# Patient Record
Sex: MALE | Race: WHITE | NOT HISPANIC OR LATINO
[De-identification: names, ages, dates, MRNs, and addresses within clinical notes are randomized per-mention and may not be internally consistent; named-entity substitution may affect disease eponyms.]

---

## 2017-12-31 ENCOUNTER — NON-APPOINTMENT (OUTPATIENT)
Age: 57
End: 2017-12-31

## 2018-09-27 ENCOUNTER — FORM ENCOUNTER (OUTPATIENT)
Age: 58
End: 2018-09-27

## 2020-09-16 ENCOUNTER — EMERGENCY (EMERGENCY)
Facility: HOSPITAL | Age: 60
LOS: 0 days | Discharge: HOME | End: 2020-09-16
Payer: COMMERCIAL

## 2020-09-16 VITALS
SYSTOLIC BLOOD PRESSURE: 109 MMHG | RESPIRATION RATE: 18 BRPM | TEMPERATURE: 99 F | HEART RATE: 74 BPM | OXYGEN SATURATION: 95 % | DIASTOLIC BLOOD PRESSURE: 62 MMHG

## 2020-09-16 DIAGNOSIS — Z20.828 CONTACT WITH AND (SUSPECTED) EXPOSURE TO OTHER VIRAL COMMUNICABLE DISEASES: ICD-10-CM

## 2020-09-16 PROCEDURE — 99283 EMERGENCY DEPT VISIT LOW MDM: CPT

## 2020-09-16 PROCEDURE — U0003: CPT

## 2020-09-16 NOTE — ED STATDOCS - OBJECTIVE STATEMENT
Pt presents to ED for covid swab. Needs swab to take dad to dental appointment. No complaints at this time.   Pt here for testing.

## 2020-09-16 NOTE — ED STATDOCS - PATIENT PORTAL LINK FT
You can access the FollowMyHealth Patient Portal offered by [facility] by registering at the following website: [email or web address]. By joining [employer]’s FollowMyHealth portal, you will also be able to view your health information using other applications (apps) compatible with our system.

## 2020-09-16 NOTE — ED STATDOCS - PHYSICAL EXAMINATION
Constitutional: NAD AAOx3. Nontoxic, well appearing. Speaking full sentences  w/o distress  Head: Normocephalic atraumatic  Mouth: no airway obstruction  Cardiac: [id number]   Resp: Lungs CTA B/L  Abd: soft, nd. NTTP. No r/g/r.   Neuro: motor and sensory intact

## 2020-09-17 LAB — SARS-COV-2 RNA SPEC QL NAA+PROBE: SIGNIFICANT CHANGE UP

## 2021-05-14 ENCOUNTER — NON-APPOINTMENT (OUTPATIENT)
Age: 61
End: 2021-05-14

## 2021-05-14 ENCOUNTER — APPOINTMENT (OUTPATIENT)
Dept: FAMILY MEDICINE | Facility: CLINIC | Age: 61
End: 2021-05-14
Payer: COMMERCIAL

## 2021-05-14 VITALS
HEIGHT: 65 IN | SYSTOLIC BLOOD PRESSURE: 120 MMHG | HEART RATE: 65 BPM | TEMPERATURE: 98.5 F | DIASTOLIC BLOOD PRESSURE: 70 MMHG | BODY MASS INDEX: 31.65 KG/M2 | OXYGEN SATURATION: 98 % | WEIGHT: 190 LBS

## 2021-05-14 DIAGNOSIS — Z82.0 FAMILY HISTORY OF EPILEPSY AND OTHER DISEASES OF THE NERVOUS SYSTEM: ICD-10-CM

## 2021-05-14 DIAGNOSIS — Z78.9 OTHER SPECIFIED HEALTH STATUS: ICD-10-CM

## 2021-05-14 PROCEDURE — 93000 ELECTROCARDIOGRAM COMPLETE: CPT

## 2021-05-14 PROCEDURE — 99072 ADDL SUPL MATRL&STAF TM PHE: CPT

## 2021-05-14 PROCEDURE — 99386 PREV VISIT NEW AGE 40-64: CPT | Mod: 25

## 2021-05-14 RX ORDER — UBIDECARENONE/VIT E ACET 100MG-5
25 MCG CAPSULE ORAL
Refills: 0 | Status: ACTIVE | COMMUNITY

## 2021-05-14 RX ORDER — QUINAPRIL HYDROCHLORIDE 10 MG/1
10 TABLET, FILM COATED ORAL
Refills: 0 | Status: ACTIVE | COMMUNITY

## 2021-05-14 RX ORDER — LEVOTHYROXINE SODIUM 0.05 MG/1
50 TABLET ORAL
Refills: 0 | Status: ACTIVE | COMMUNITY

## 2021-05-14 NOTE — PLAN
[FreeTextEntry1] : Reviewed age-appropriate preventive screening tests with patient. He is not due for any vaccines or colonoscopy.\par \par Discussed clean eating (e.g. Mediterranean style diet) and recommendations for regular exercise/staying as physically active as possible. He would like to lose 20 pounds and will work on diet and exercise for this.\par \par Reviewed importance of good self care (e.g. meditation, yoga, adequate rest, regular exercise, magnesium, clean eating, etc.).\par \par Rx given for labs today. He will have his blood drawn next week for Dr. Pitts and those results will be reviewed once received.

## 2021-05-14 NOTE — HISTORY OF PRESENT ILLNESS
[FreeTextEntry1] : RAFA ULRICH is a 61 year old male here for a physical exam.\par  [de-identified] : Patient is new to our practice. He previously saw Dr. Webber who has retired. He was referred by his brother Bharath who is a patient of our office.\par \par He has had the COVID vaccine. He has had the flu vaccine and Shingrix (both doses). He believes he is up to date with his Tdap. His last colonoscopy was a few years ago (Dr. Chen). He is up to date with the dentist and eye doctor. He does not see a dermatologist. He exercises regularly (exercise bicycle). His diet is good but he has gained weight.\par \par He was diagnosed with diabetes a few years ago. He has been seeing Dr. Pitts for this and his most recent HgbA1c was under 6.0. He is not taking any medication for diabetes. \par \par He also has hypertension and hypothyroidism, both controlled on medication.

## 2021-05-14 NOTE — ASSESSMENT
[FreeTextEntry1] : He has a history of diet controlled diabetes, hypertension, and hypothyroidism. These have all been managed by his endocrinologist and he is scheduled for blood work for the endocrinologist next week.\par \par His EKG today shows an incomplete RBBB and left anterior fascicular block. He has no history of cardiac disease and no cardiac symptoms. Will request an old EKG from his previous PMD for comparison.

## 2021-08-02 ENCOUNTER — APPOINTMENT (OUTPATIENT)
Dept: FAMILY MEDICINE | Facility: CLINIC | Age: 61
End: 2021-08-02
Payer: COMMERCIAL

## 2021-08-02 PROCEDURE — 36415 COLL VENOUS BLD VENIPUNCTURE: CPT

## 2021-08-03 ENCOUNTER — NON-APPOINTMENT (OUTPATIENT)
Age: 61
End: 2021-08-03

## 2021-08-03 LAB — PSA SERPL-MCNC: 0.15 NG/ML

## 2021-09-22 ENCOUNTER — APPOINTMENT (OUTPATIENT)
Dept: FAMILY MEDICINE | Facility: CLINIC | Age: 61
End: 2021-09-22
Payer: COMMERCIAL

## 2021-09-22 VITALS
BODY MASS INDEX: 31.82 KG/M2 | DIASTOLIC BLOOD PRESSURE: 70 MMHG | TEMPERATURE: 96.2 F | WEIGHT: 191 LBS | SYSTOLIC BLOOD PRESSURE: 116 MMHG | HEIGHT: 65 IN | OXYGEN SATURATION: 97 % | HEART RATE: 73 BPM

## 2021-09-22 DIAGNOSIS — M25.529 PAIN IN UNSPECIFIED ELBOW: ICD-10-CM

## 2021-09-22 DIAGNOSIS — Z23 ENCOUNTER FOR IMMUNIZATION: ICD-10-CM

## 2021-09-22 PROCEDURE — 90686 IIV4 VACC NO PRSV 0.5 ML IM: CPT

## 2021-09-22 PROCEDURE — G0008: CPT

## 2021-09-22 PROCEDURE — 99213 OFFICE O/P EST LOW 20 MIN: CPT | Mod: 25

## 2021-09-22 NOTE — HISTORY OF PRESENT ILLNESS
[FreeTextEntry8] : Pt has had right elbow discomfort x several weeks , he has tried cold compresses and tennis elbow brace with out any relief\par He admits he is left handed and in recent weeks he has been weeding his garden, and using his non dominant right arm .  \par He admits after several hours of repetitive weeding , his elbow feels achy. He denies any swelling , redness or crepitation

## 2021-09-22 NOTE — PLAN
[FreeTextEntry1] : Advised to use elbow brace,and limited activity of right elbow.  I have advised warm and cold compresses , and topical Voltaren gel . \par Flu vaccine administered today

## 2022-04-05 ENCOUNTER — APPOINTMENT (OUTPATIENT)
Dept: FAMILY MEDICINE | Facility: CLINIC | Age: 62
End: 2022-04-05
Payer: COMMERCIAL

## 2022-04-05 VITALS
HEIGHT: 65 IN | BODY MASS INDEX: 31.65 KG/M2 | HEART RATE: 85 BPM | DIASTOLIC BLOOD PRESSURE: 72 MMHG | TEMPERATURE: 96.3 F | OXYGEN SATURATION: 98 % | WEIGHT: 190 LBS | SYSTOLIC BLOOD PRESSURE: 122 MMHG

## 2022-04-05 DIAGNOSIS — E55.9 VITAMIN D DEFICIENCY, UNSPECIFIED: ICD-10-CM

## 2022-04-05 PROCEDURE — 99214 OFFICE O/P EST MOD 30 MIN: CPT

## 2022-04-05 NOTE — ASSESSMENT
[FreeTextEntry1] : At this point he feels that his acute gout attack has resolved, thanks to Colchicine. This was his second gout attack in 3 weeks, but he had never had a previous attack. His uric acid level was moderately elevated at 8.1.

## 2022-04-05 NOTE — PLAN
[FreeTextEntry1] : We discussed various treatment options for gout, including a low purine diet, use of Colchicine PRN, and a daily urate lowering medication. At this point he would prefer to use Colchicine PRN and monitor for any further episode of gout. He will have labs done by by Dr. CHUN in June and will follow up here for his annual physical in August so the uric acid can be checked twice in the next 4 months. If is uric acid remains elevated or if he has any further gout attacks, would consider prescribing Allopurinol.

## 2022-04-05 NOTE — HISTORY OF PRESENT ILLNESS
[FreeTextEntry8] : Patient presents with gout. This was diagnosed by his podiatrist. He had pain in his left great toe starting about 10 days ago. He saw Dr. Gil last Monday and she checked his uric acid level which was elevated at 8.1. Dr. Gil prescribed colchicine which has been taking 1-2 times per day for the past week. He was taking Advil as well but he stopped this. He had a prior gout attack about 3 weeks prior, also diagnosed by Podiatry. He does not feel that his diet is high in purines.\par \par He has no previous history of gout. He does have diet controlled diabetes, hypertension, and hypothyroidism which are managed by his endocrinologist, Dr. Pitts. He last saw Dr. CHUN in February and will see him again in June.\par \par His last visit to our office was in 5/2021 for his annual physical.

## 2022-04-21 ENCOUNTER — APPOINTMENT (OUTPATIENT)
Dept: FAMILY MEDICINE | Facility: CLINIC | Age: 62
End: 2022-04-21
Payer: COMMERCIAL

## 2022-04-21 VITALS
HEIGHT: 65 IN | OXYGEN SATURATION: 98 % | WEIGHT: 190 LBS | SYSTOLIC BLOOD PRESSURE: 112 MMHG | TEMPERATURE: 97.5 F | DIASTOLIC BLOOD PRESSURE: 72 MMHG | BODY MASS INDEX: 31.65 KG/M2 | HEART RATE: 76 BPM

## 2022-04-21 DIAGNOSIS — E11.9 TYPE 2 DIABETES MELLITUS W/OUT COMPLICATIONS: ICD-10-CM

## 2022-04-21 PROCEDURE — 36415 COLL VENOUS BLD VENIPUNCTURE: CPT

## 2022-04-21 PROCEDURE — 99214 OFFICE O/P EST MOD 30 MIN: CPT | Mod: 25

## 2022-04-21 NOTE — HEALTH RISK ASSESSMENT
[0] : 2) Feeling down, depressed, or hopeless: Not at all (0) [PHQ-2 Negative - No further assessment needed] : PHQ-2 Negative - No further assessment needed [[id number]] : 0

## 2022-04-21 NOTE — HISTORY OF PRESENT ILLNESS
[FreeTextEntry1] : RAFA ULRICH is a 62 year old male here for a follow up visit.  [de-identified] : He is here to discuss treatment for gout. He was diagnosed with gout by his podiatrist in 3/2022 based upon symptoms. He had pain in his great toe which was relieved with colchicine and a uric acid level of 8.1. He had a second episode about 3 weeks after the first, and followed up in our office on 4/5/22 to discuss this. At that time we discussed treatment options for gout including a low purine diet, use of Colchicine PRN, and a daily urate lowering medication. He did not feel that his symptoms warranted a daily medication at that time. He has had one more gout attack in the past 2 weeks since his last visit. He would like a refill on his colchicine.

## 2022-04-21 NOTE — ASSESSMENT
[FreeTextEntry1] : He a history of diet controlled diabetes, hypertension, and hypothyroidism which are managed by his endocrinologist, Dr. Pitts. He last saw Dr. CHUN in February and will see him again in June. He now has a new diagnosis of gout. His labs from Dr. Pitts in 2021 showed an elevated creatinine in the 1.4 to 1.6 range. This suggests that chronic renal failure could be the cause of his new onset gout.\par \par Reviewing his medication list, he is taking Quinapril and Chlorthalidone for hypertension. It is possible that Chlorthalidone is the cause of both his elevated creatinine and uric acid and therefore the trigger for his gout.

## 2022-04-21 NOTE — PLAN
[FreeTextEntry1] : Will recheck his creatinine and uric acid levels today. If either or both of these is still elevated, will hold Chlorthalidone for a few weeks and he will follow up to recheck his blood pressure and labs once more. If his uric acid and creatinine are improved of the diuretic but his BP is elevated, will increase Quinapril or add another agent for BP control.\par \par Discussed clean eating (e.g. Mediterranean style diet) and recommendations for regular exercise/staying as physically active as possible.\par \par Reviewed importance of good self care (e.g. meditation, yoga, adequate rest, regular exercise, magnesium, clean eating, etc.).

## 2022-04-22 LAB
ANION GAP SERPL CALC-SCNC: 14 MMOL/L
BUN SERPL-MCNC: 18 MG/DL
CALCIUM SERPL-MCNC: 10.4 MG/DL
CHLORIDE SERPL-SCNC: 99 MMOL/L
CO2 SERPL-SCNC: 26 MMOL/L
CREAT SERPL-MCNC: 1.31 MG/DL
EGFR: 61 ML/MIN/1.73M2
GLUCOSE SERPL-MCNC: 100 MG/DL
POTASSIUM SERPL-SCNC: 4.2 MMOL/L
SODIUM SERPL-SCNC: 140 MMOL/L
URATE SERPL-MCNC: 8.3 MG/DL

## 2022-04-25 ENCOUNTER — NON-APPOINTMENT (OUTPATIENT)
Age: 62
End: 2022-04-25

## 2022-05-04 ENCOUNTER — APPOINTMENT (OUTPATIENT)
Dept: FAMILY MEDICINE | Facility: CLINIC | Age: 62
End: 2022-05-04
Payer: COMMERCIAL

## 2022-05-04 VITALS — SYSTOLIC BLOOD PRESSURE: 126 MMHG | DIASTOLIC BLOOD PRESSURE: 78 MMHG

## 2022-05-04 PROCEDURE — ZZZZZ: CPT

## 2022-05-09 ENCOUNTER — APPOINTMENT (OUTPATIENT)
Dept: FAMILY MEDICINE | Facility: CLINIC | Age: 62
End: 2022-05-09
Payer: COMMERCIAL

## 2022-05-09 VITALS — SYSTOLIC BLOOD PRESSURE: 118 MMHG | DIASTOLIC BLOOD PRESSURE: 70 MMHG

## 2022-05-09 VITALS
OXYGEN SATURATION: 98 % | SYSTOLIC BLOOD PRESSURE: 128 MMHG | HEIGHT: 65 IN | TEMPERATURE: 98.7 F | WEIGHT: 190 LBS | HEART RATE: 71 BPM | DIASTOLIC BLOOD PRESSURE: 76 MMHG | BODY MASS INDEX: 31.65 KG/M2

## 2022-05-09 PROCEDURE — 36415 COLL VENOUS BLD VENIPUNCTURE: CPT

## 2022-05-09 PROCEDURE — 99213 OFFICE O/P EST LOW 20 MIN: CPT | Mod: 25

## 2022-05-09 RX ORDER — CHLORTHALIDONE 25 MG/1
25 TABLET ORAL
Refills: 0 | Status: DISCONTINUED | COMMUNITY
End: 2022-05-09

## 2022-05-09 NOTE — PLAN
[FreeTextEntry1] : Continue all medications as prescribed. Check labs as above. Will adjust any medications based upon lab results.\par \par Discussed clean eating (e.g. Mediterranean style diet) and recommendations for regular exercise/staying as physically active as possible.\par \par Reviewed importance of good self care (e.g. meditation, yoga, adequate rest, regular exercise, magnesium, clean eating, etc.).

## 2022-05-09 NOTE — ASSESSMENT
[FreeTextEntry1] : He has a history of diet controlled diabetes, hypertension, and hypothyroidism which are managed by his endocrinologist, Dr. Pitts. He last saw Dr. CHUN in February and will see him again in June. He now has a new diagnosis of gout. His labs from Dr. Pitts in 2021 showed an elevated creatinine in the 1.4 to 1.6 range. This suggests that chronic renal failure could be the cause of his new onset gout. He was advised to discontinue his chlorthalidone and return to recheck his blood pressure and labs.\par \par His blood pressure is stable without Chlorthalidone. He has had no gout attacks recently though he was taking Colchicine for about a week after his last visit.

## 2022-05-09 NOTE — HISTORY OF PRESENT ILLNESS
[FreeTextEntry1] : RAFA ULRICH is a 62 year old male here for a follow up visit.  [de-identified] : Patient was diagnosed with gout by his podiatrist in 3/2022 based upon symptoms. He had pain in his great toe which was relieved with colchicine and a uric acid level of 8.1. He had a second episode about 3 weeks after the first and followed up in our office on 4/5/22 to discuss this. At that time we discussed treatment options for gout including a low purine diet, use of Colchicine PRN, and a daily urate lowering medication. He did not feel that his symptoms warranted a daily medication at that time. He had another gout attack between 4/5 and his follow up visit on 4/21. At that time his lab from endocrinology were reviewed and it was discovered that his creatinine was elevated in the 1.4 to 1.6 range over the past year. He was advised to discontinue his chlorthalidone and return to recheck his blood pressure and labs.

## 2022-05-10 LAB
ANION GAP SERPL CALC-SCNC: 13 MMOL/L
BUN SERPL-MCNC: 22 MG/DL
CALCIUM SERPL-MCNC: 10 MG/DL
CHLORIDE SERPL-SCNC: 102 MMOL/L
CO2 SERPL-SCNC: 23 MMOL/L
CREAT SERPL-MCNC: 1.19 MG/DL
EGFR: 69 ML/MIN/1.73M2
GLUCOSE SERPL-MCNC: 87 MG/DL
POTASSIUM SERPL-SCNC: 5.5 MMOL/L
SODIUM SERPL-SCNC: 138 MMOL/L
URATE SERPL-MCNC: 7.8 MG/DL

## 2022-05-12 ENCOUNTER — NON-APPOINTMENT (OUTPATIENT)
Age: 62
End: 2022-05-12

## 2022-05-17 ENCOUNTER — RX CHANGE (OUTPATIENT)
Age: 62
End: 2022-05-17

## 2022-05-18 ENCOUNTER — RX CHANGE (OUTPATIENT)
Age: 62
End: 2022-05-18

## 2022-05-19 ENCOUNTER — RX CHANGE (OUTPATIENT)
Age: 62
End: 2022-05-19

## 2022-05-19 RX ORDER — COLCHICINE 0.6 MG/1
0.6 TABLET ORAL
Qty: 60 | Refills: 3 | Status: DISCONTINUED | COMMUNITY
Start: 2022-05-19 | End: 2022-05-19

## 2022-05-19 RX ORDER — COLCHICINE 0.6 MG/1
0.6 TABLET ORAL
Qty: 180 | Refills: 3 | Status: ACTIVE | COMMUNITY
Start: 2022-05-19 | End: 1900-01-01

## 2022-05-28 ENCOUNTER — NON-APPOINTMENT (OUTPATIENT)
Age: 62
End: 2022-05-28

## 2022-06-06 ENCOUNTER — APPOINTMENT (OUTPATIENT)
Dept: FAMILY MEDICINE | Facility: CLINIC | Age: 62
End: 2022-06-06
Payer: COMMERCIAL

## 2022-06-06 VITALS
WEIGHT: 190 LBS | HEIGHT: 65 IN | OXYGEN SATURATION: 98 % | BODY MASS INDEX: 31.65 KG/M2 | HEART RATE: 73 BPM | SYSTOLIC BLOOD PRESSURE: 122 MMHG | DIASTOLIC BLOOD PRESSURE: 82 MMHG | TEMPERATURE: 97 F

## 2022-06-06 PROCEDURE — 99213 OFFICE O/P EST LOW 20 MIN: CPT

## 2022-06-06 NOTE — PLAN
[FreeTextEntry1] : Will start Allopurinol and he will return to recheck his labs (uric acid, CMP) in August. He is seeing his endocrinologist soon and is having lab done in 2 days prior to that visit.\par \par Discussed clean eating (e.g. Mediterranean style diet) and recommendations for regular exercise/staying as physically active as possible.\par \par Reviewed importance of good self care (e.g. meditation, yoga, adequate rest, regular exercise, magnesium, clean eating, etc.).\par \par Follow up based upon lab results.\par

## 2022-06-06 NOTE — ASSESSMENT
[FreeTextEntry1] : He is here to follow up on hypertension, chronic renal failure, and gout. He also has a history of diabetes and hypothyroidism managed by endocrinology. He has been compliant with medications, diet, and exercise.\par \par His blood pressure is stable without chlorthalidone.\par \par He is currently taking colchicine 0.6 mg daily but would like to try Allopurinol.

## 2022-06-06 NOTE — HISTORY OF PRESENT ILLNESS
[FreeTextEntry1] : RAFA ULRICH is a 62 year old male here for a follow up visit.  [de-identified] : He has a history of hypertension, chronic renal failure, and gout. He was diagnosed with gout in 3/2022 by his podiatrist and was prescribed colchicine. He has had several gout attacks since that time and Allopurinol was recommended. He did not feel that he needed a daily medication initially but now he is interested in trying Allopurinol. \par \par His chlorthalidone was discontinued due to CRF and his creatinine improved. His blood pressure was stable at his last visit without chlorthalidone as well.

## 2022-06-14 ENCOUNTER — FORM ENCOUNTER (OUTPATIENT)
Age: 62
End: 2022-06-14

## 2022-07-27 ENCOUNTER — FORM ENCOUNTER (OUTPATIENT)
Age: 62
End: 2022-07-27

## 2022-07-29 LAB
HBA1C MFR BLD HPLC: 6
HBA1C MFR BLD HPLC: 6

## 2022-08-01 ENCOUNTER — NON-APPOINTMENT (OUTPATIENT)
Age: 62
End: 2022-08-01

## 2022-08-04 ENCOUNTER — APPOINTMENT (OUTPATIENT)
Dept: FAMILY MEDICINE | Facility: CLINIC | Age: 62
End: 2022-08-04

## 2022-08-04 PROCEDURE — 36415 COLL VENOUS BLD VENIPUNCTURE: CPT

## 2022-08-06 LAB
ALBUMIN SERPL ELPH-MCNC: 4.4 G/DL
ALP BLD-CCNC: 76 U/L
ALT SERPL-CCNC: 16 U/L
ANION GAP SERPL CALC-SCNC: 11 MMOL/L
AST SERPL-CCNC: 16 U/L
BILIRUB SERPL-MCNC: 0.4 MG/DL
BUN SERPL-MCNC: 18 MG/DL
CALCIUM SERPL-MCNC: 10.3 MG/DL
CHLORIDE SERPL-SCNC: 103 MMOL/L
CHOLEST SERPL-MCNC: 61 MG/DL
CO2 SERPL-SCNC: 27 MMOL/L
CREAT SERPL-MCNC: 1.19 MG/DL
EGFR: 69 ML/MIN/1.73M2
GLUCOSE SERPL-MCNC: 111 MG/DL
HDLC SERPL-MCNC: 29 MG/DL
LDLC SERPL CALC-MCNC: 19 MG/DL
NONHDLC SERPL-MCNC: 32 MG/DL
POTASSIUM SERPL-SCNC: 4.6 MMOL/L
PROT SERPL-MCNC: 7.8 G/DL
PSA SERPL-MCNC: 0.14 NG/ML
SODIUM SERPL-SCNC: 140 MMOL/L
TRIGL SERPL-MCNC: 62 MG/DL
URATE SERPL-MCNC: 6.2 MG/DL

## 2022-08-24 ENCOUNTER — RESULT CHARGE (OUTPATIENT)
Age: 62
End: 2022-08-24

## 2022-08-25 ENCOUNTER — NON-APPOINTMENT (OUTPATIENT)
Age: 62
End: 2022-08-25

## 2022-08-25 ENCOUNTER — APPOINTMENT (OUTPATIENT)
Dept: FAMILY MEDICINE | Facility: CLINIC | Age: 62
End: 2022-08-25

## 2022-08-25 VITALS
BODY MASS INDEX: 32.49 KG/M2 | SYSTOLIC BLOOD PRESSURE: 110 MMHG | HEART RATE: 83 BPM | DIASTOLIC BLOOD PRESSURE: 82 MMHG | HEIGHT: 65 IN | WEIGHT: 195 LBS | OXYGEN SATURATION: 98 % | TEMPERATURE: 97 F

## 2022-08-25 DIAGNOSIS — Z00.00 ENCOUNTER FOR GENERAL ADULT MEDICAL EXAMINATION W/OUT ABNORMAL FINDINGS: ICD-10-CM

## 2022-08-25 PROCEDURE — 90686 IIV4 VACC NO PRSV 0.5 ML IM: CPT

## 2022-08-25 PROCEDURE — 90472 IMMUNIZATION ADMIN EACH ADD: CPT

## 2022-08-25 PROCEDURE — 36415 COLL VENOUS BLD VENIPUNCTURE: CPT

## 2022-08-25 PROCEDURE — 90715 TDAP VACCINE 7 YRS/> IM: CPT

## 2022-08-25 PROCEDURE — G0008: CPT | Mod: 59

## 2022-08-25 PROCEDURE — 99396 PREV VISIT EST AGE 40-64: CPT | Mod: 25

## 2022-08-25 PROCEDURE — 93000 ELECTROCARDIOGRAM COMPLETE: CPT

## 2022-08-25 RX ORDER — INDOMETHACIN 50 MG/1
50 CAPSULE ORAL
Refills: 0 | Status: ACTIVE | COMMUNITY

## 2022-08-25 NOTE — HEALTH RISK ASSESSMENT
[0] : 2) Feeling down, depressed, or hopeless: Not at all (0) [PHQ-2 Negative - No further assessment needed] : PHQ-2 Negative - No further assessment needed [[id number]] : 0

## 2022-08-25 NOTE — PLAN
[FreeTextEntry1] : Continue all medications as prescribed.\par \par Reviewed age-appropriate preventive screening tests with patient. He is due for Tdap and a flu shot and agreed to both of these today.\par \par Discussed clean eating (e.g. Mediterranean style diet) and recommendations for regular exercise/staying as physically active as possible.\par \par Reviewed importance of good self care (e.g. meditation, yoga, adequate rest, regular exercise, magnesium, clean eating, etc.).\par \par Follow up for next physical in one year.

## 2022-08-25 NOTE — ASSESSMENT
[FreeTextEntry1] : RAFA ULIRCH is a 62 year old male here for a physical exam.\par \par Patient has a history of diet controlled diabetes, hypertension, gout, hypothyroidism, and vitamin D deficiency. He saw a rheumatologist for foot pain and she prescribed Indomethacin. He took this for a week and he feels that it helped. He is currently taking colchicine three days a week and feels that this is helping. He would like a refill on Allopurinol today.\par \par Labs were done prior. His PSA is normal. His uric acid is lower than before since he started Allopurinol. His fasting glucose is 111. He previously had a HgbA1c of 6.0 in June. His cholesterol is very low at 61 with an HDL of 29 and LDL of 19. He is not taking any cholesterol medication so it is unclear why his cholesterol would be so low, though the current numbers are similar to the ones done by his endocrinologist in 2020 and 2021. 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

## 2022-08-25 NOTE — HISTORY OF PRESENT ILLNESS
[FreeTextEntry1] : RAFA ULRICH is a 62 year old male here for a physical exam.  [de-identified] : His last physical exam was last year\par \par Vaccines:\par Tetanus is NOT up to date\par Shingrix is up to date\par COVID vaccine is up to date\par \par His last dentist visit was less than one year ago\par His last eye doctor appointment was less than one year ago\par His last dermatologist visit was never\par \par Colon cancer screening is up to date; colonoscopy in 2018 ()\par \par His diet is healthy overall\par Exercise: exercise bicycle

## 2022-10-18 ENCOUNTER — APPOINTMENT (OUTPATIENT)
Dept: FAMILY MEDICINE | Facility: CLINIC | Age: 62
End: 2022-10-18

## 2022-11-07 ENCOUNTER — APPOINTMENT (OUTPATIENT)
Dept: FAMILY MEDICINE | Facility: CLINIC | Age: 62
End: 2022-11-07

## 2022-11-07 VITALS
TEMPERATURE: 97.7 F | DIASTOLIC BLOOD PRESSURE: 82 MMHG | HEIGHT: 65 IN | HEART RATE: 67 BPM | SYSTOLIC BLOOD PRESSURE: 120 MMHG | BODY MASS INDEX: 31.65 KG/M2 | WEIGHT: 190 LBS | OXYGEN SATURATION: 97 %

## 2022-11-07 DIAGNOSIS — H81.10 BENIGN PAROXYSMAL VERTIGO, UNSPECIFIED EAR: ICD-10-CM

## 2022-11-07 PROCEDURE — 99213 OFFICE O/P EST LOW 20 MIN: CPT

## 2022-11-07 RX ORDER — MECLIZINE HYDROCHLORIDE 25 MG/1
25 TABLET ORAL 3 TIMES DAILY
Qty: 30 | Refills: 0 | Status: ACTIVE | COMMUNITY
Start: 2022-11-07 | End: 1900-01-01

## 2022-11-07 NOTE — ASSESSMENT
[FreeTextEntry1] : Patient is a 63yo male presenting to the office complaining of positional dizziness.\par \par BPPV\par - History consistent with benign positional vertigo.\par - Neuro intact without deficit in office.\par - RX for PT provided -- pt's father had vertigo and was seen by [facility] Sage for treatment with resolution.\par - Meclizine TID PRN, encouraged to take at night before bed to decrease nighttime symptoms; Do not drink alcohol, drive, or operate heavy machinery when taking Meclizine as it causes drowsiness.\par - Pt encouraged to increase hydration.\par - Call the office or go to the ED immediately if you develop new, worsening or concerning symptoms including high fever, severe headache/worst headache of your life, confusion, dizziness/lightheadedness, loss of consciousness, severe chest pain, difficulty breathing, shortness of breath, severe abdominal pain, excessive vomiting/diarrhea, inability to feel/move the extremities, or any other concerning symptoms.\par

## 2022-11-07 NOTE — PHYSICAL EXAM
[No Edema] : there was no peripheral edema [Normal] : no rash [Coordination Grossly Intact] : coordination grossly intact [No Focal Deficits] : no focal deficits [Normal Gait] : normal gait [Normal Affect] : the affect was normal [Normal Insight/Judgement] : insight and judgment were intact [de-identified] : CN II-XII intact, no facial asymmetry; strength 5/5 bilateral upper and lower extremities; distal pulses intact and equal; coordination intact.

## 2022-11-07 NOTE — HISTORY OF PRESENT ILLNESS
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

## 2022-11-07 NOTE — REVIEW OF SYSTEMS
[Headache] : no headache [Dizziness] : dizziness [Fainting] : no fainting [Unsteady Walk] : no ataxia [Negative] : Heme/Lymph

## 2023-02-03 ENCOUNTER — APPOINTMENT (OUTPATIENT)
Dept: FAMILY MEDICINE | Facility: CLINIC | Age: 63
End: 2023-02-03
Payer: COMMERCIAL

## 2023-02-03 VITALS
TEMPERATURE: 97 F | HEART RATE: 77 BPM | OXYGEN SATURATION: 98 % | HEIGHT: 65 IN | DIASTOLIC BLOOD PRESSURE: 82 MMHG | BODY MASS INDEX: 31.65 KG/M2 | SYSTOLIC BLOOD PRESSURE: 142 MMHG | WEIGHT: 190 LBS

## 2023-02-03 DIAGNOSIS — E03.9 HYPOTHYROIDISM, UNSPECIFIED: ICD-10-CM

## 2023-02-03 DIAGNOSIS — N18.30 CHRONIC KIDNEY DISEASE, STAGE 3 UNSPECIFIED: ICD-10-CM

## 2023-02-03 DIAGNOSIS — I10 ESSENTIAL (PRIMARY) HYPERTENSION: ICD-10-CM

## 2023-02-03 DIAGNOSIS — M10.9 GOUT, UNSPECIFIED: ICD-10-CM

## 2023-02-03 DIAGNOSIS — M25.562 PAIN IN LEFT KNEE: ICD-10-CM

## 2023-02-03 PROCEDURE — 99214 OFFICE O/P EST MOD 30 MIN: CPT

## 2023-02-03 NOTE — HEALTH RISK ASSESSMENT
[0] : 2) Feeling down, depressed, or hopeless: Not at all (0) [PHQ-2 Negative - No further assessment needed] : PHQ-2 Negative - No further assessment needed [[id number]] : 0 [Never] : Never

## 2023-02-03 NOTE — ASSESSMENT
[FreeTextEntry1] : He has a history of gout and diabetes followed up by rheumatology. His gout was treated by Colchicine daily but has halted use due to being put on Allopurinol and Indomethacin. He denies taking Indomethacin as of yet. \par \par Explained to patient that gout attacks are caused by a buildup of uric acid in the blood which can occur via:\par -Inadequate water intake\par -Overuse causing knee trauma\par \par His BP is borderline high possibly due to pain\par \par

## 2023-02-03 NOTE — HISTORY OF PRESENT ILLNESS
[FreeTextEntry8] : Patient presents with knee pain. He has been experiencing pain in his left knee for the past couple of days. He has a history of gout and diabetes and has h/o gout attack in his toes; notices it following biking, walking etc. Treatment has included Tylenol and Allopurinol.

## 2023-02-03 NOTE — PHYSICAL EXAM
[Normal] : normal gait, coordination grossly intact, no focal deficits and deep tendon reflexes were 2+ and symmetric [de-identified] : joint swelling, redness, warm to the touch

## 2023-02-03 NOTE — PLAN
[FreeTextEntry1] : Advised to take Indomethacin following lunch and at dinner if needed. Informed to take Colchicine if symptoms do not improve tomorrow and to wait a couple hours. If pain goes away, told patient symptoms can be attributed to gout and informed to take an increased dosage of Indomethacin following. Recommended to apply ice to reduce inflammation and swelling. \par \par Monitor BP at home with Indomethacin use.

## 2023-05-03 ENCOUNTER — OUTPATIENT (OUTPATIENT)
Dept: OUTPATIENT SERVICES | Facility: HOSPITAL | Age: 63
LOS: 1 days | End: 2023-05-03
Payer: COMMERCIAL

## 2023-05-03 ENCOUNTER — APPOINTMENT (OUTPATIENT)
Dept: FAMILY MEDICINE | Facility: CLINIC | Age: 63
End: 2023-05-03
Payer: COMMERCIAL

## 2023-05-03 ENCOUNTER — RESULT REVIEW (OUTPATIENT)
Age: 63
End: 2023-05-03

## 2023-05-03 ENCOUNTER — APPOINTMENT (OUTPATIENT)
Dept: RADIOLOGY | Facility: CLINIC | Age: 63
End: 2023-05-03
Payer: COMMERCIAL

## 2023-05-03 VITALS
OXYGEN SATURATION: 97 % | SYSTOLIC BLOOD PRESSURE: 130 MMHG | TEMPERATURE: 96.7 F | BODY MASS INDEX: 31.65 KG/M2 | HEART RATE: 62 BPM | DIASTOLIC BLOOD PRESSURE: 72 MMHG | HEIGHT: 65 IN | WEIGHT: 190 LBS

## 2023-05-03 DIAGNOSIS — R07.81 PLEURODYNIA: ICD-10-CM
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
## 2023-05-03 PROCEDURE — 71100 X-RAY EXAM RIBS UNI 2 VIEWS: CPT | Mod: 26,RT

## 2023-05-03 PROCEDURE — 71100 X-RAY EXAM RIBS UNI 2 VIEWS: CPT

## 2023-05-03 PROCEDURE — 99213 OFFICE O/P EST LOW 20 MIN: CPT

## 2023-05-03 NOTE — ASSESSMENT
[FreeTextEntry1] : Patient is a 62yo male who presents to the office complaining of persistent right anterior/posterior chest wall pain s/p mechanical trip and fall 1 week ago today.  Denies shortness of breath.  No other injuries reported.\par \par Rib pain s/p fall\par - X-ray right ribs.\par - Ibuprofen every 8 hours as needed for pain, alternate with Acetaminophen every 6 hours as needed.\par - Encouraged to take deep, meaningful breaths.\par - Call the office or go to the ED immediately if you develop new, worsening or concerning symptoms including high fever, severe headache/worst headache of your life, confusion, dizziness/lightheadedness, loss of consciousness, severe chest pain, difficulty breathing, shortness of breath, severe abdominal pain, excessive vomiting/diarrhea, inability to feel/move the extremities, or any other concerning symptoms.\par

## 2023-05-03 NOTE — HISTORY OF PRESENT ILLNESS
[FreeTextEntry8] : Patient is a 62yo male who presents to the office complaining of persistent chest wall pain s/p mechanical fall.\par Pt states he was playing [employer] ball, tripped and fell onto his right side on Wednesday 4/26/23.\par States he fell with his arm pressed against his chest.\par States he is having persistent pain in the right anterior and posterior chest.\par Worse with movement of the torso, sneezing, etc.\par Denies difficulty breathing or hemoptysis.\par Has been taking Acetaminophen with some improvement.\par Pt has been able to perform ADLs without issue, continues to play [employer].

## 2023-05-03 NOTE — PHYSICAL EXAM
[No JVD] : no jugular venous distention [No Edema] : there was no peripheral edema [Normal] : no rash [Coordination Grossly Intact] : coordination grossly intact [No Focal Deficits] : no focal deficits [Normal Gait] : normal gait [Normal Affect] : the affect was normal [Normal Insight/Judgement] : insight and judgment were intact [de-identified] : bilateral gynecomastia

## 2023-05-04 ENCOUNTER — NON-APPOINTMENT (OUTPATIENT)
Age: 63
End: 2023-05-04

## 2023-05-05 ENCOUNTER — NON-APPOINTMENT (OUTPATIENT)
Age: 63
End: 2023-05-05

## 2023-07-27 ENCOUNTER — APPOINTMENT (OUTPATIENT)
Dept: FAMILY MEDICINE | Facility: CLINIC | Age: 63
End: 2023-07-27
Payer: COMMERCIAL

## 2023-07-27 VITALS
HEART RATE: 94 BPM | HEIGHT: 65 IN | SYSTOLIC BLOOD PRESSURE: 110 MMHG | DIASTOLIC BLOOD PRESSURE: 72 MMHG | OXYGEN SATURATION: 97 % | WEIGHT: 190 LBS | TEMPERATURE: 97 F | BODY MASS INDEX: 31.65 KG/M2

## 2023-07-27 DIAGNOSIS — R21 RASH AND OTHER NONSPECIFIC SKIN ERUPTION: ICD-10-CM

## 2023-07-27 PROCEDURE — 99213 OFFICE O/P EST LOW 20 MIN: CPT

## 2023-07-27 RX ORDER — MUPIROCIN 20 MG/G
2 OINTMENT TOPICAL TWICE DAILY
Qty: 1 | Refills: 0 | Status: ACTIVE | COMMUNITY
Start: 2023-07-27 | End: 1900-01-01

## 2023-07-27 NOTE — PLAN
[FreeTextEntry1] : Will treat for folliculitis as above. If the rash spreads in a unilateral dermatomal distribution and he has increased pain, will prescribe Valtrex to cover possible shingles.

## 2023-07-27 NOTE — ASSESSMENT
[FreeTextEntry1] : He has a rash on his chest which has the appearance of chicken pox, shingles, or folliculitis. He had chicken pox years ago and has no systemic symptoms. He had the Shingrix vaccine and the rash crosses the midline so shingles is less likely.

## 2023-07-27 NOTE — HISTORY OF PRESENT ILLNESS
[FreeTextEntry8] : Patient presents with a rash on his chest. He first noticed this yesterday morning. It is painful but not itchy. He was doing yard work and thinks he may have brushed up against a plant but he is not sure it was poison ivy an in any event he had a shirt on.

## 2023-07-27 NOTE — PHYSICAL EXAM
[Grossly Normal Strength/Tone] : grossly normal strength/tone [No Focal Deficits] : no focal deficits [Normal] : affect was normal and insight and judgment were intact [de-identified] : two small vesicles on an erythematous base on the chest, on either side of the sternum; no other rash is present.

## 2023-11-16 RX ORDER — ALLOPURINOL 100 MG/1
100 TABLET ORAL
Qty: 90 | Refills: 3 | Status: ACTIVE | COMMUNITY
Start: 2022-06-06 | End: 1900-01-01

## 2023-11-17 DIAGNOSIS — M54.50 LOW BACK PAIN, UNSPECIFIED: ICD-10-CM

## 2024-01-30 LAB — HBA1C MFR BLD HPLC: 6.1

## 2024-08-19 ENCOUNTER — RX RENEWAL (OUTPATIENT)
Age: 64
End: 2024-08-19

## 2024-10-30 ENCOUNTER — NON-APPOINTMENT (OUTPATIENT)
Age: 64
End: 2024-10-30

## 2024-10-30 ENCOUNTER — APPOINTMENT (OUTPATIENT)
Dept: OTOLARYNGOLOGY | Facility: CLINIC | Age: 64
End: 2024-10-30
Payer: MEDICARE

## 2024-10-30 VITALS — WEIGHT: 190 LBS | BODY MASS INDEX: 31.65 KG/M2 | HEIGHT: 65 IN

## 2024-10-30 VITALS — SYSTOLIC BLOOD PRESSURE: 115 MMHG | HEART RATE: 72 BPM | DIASTOLIC BLOOD PRESSURE: 77 MMHG

## 2024-10-30 DIAGNOSIS — H61.23 IMPACTED CERUMEN, BILATERAL: ICD-10-CM

## 2024-10-30 DIAGNOSIS — E11.9 TYPE 2 DIABETES MELLITUS W/OUT COMPLICATIONS: ICD-10-CM

## 2024-10-30 DIAGNOSIS — H93.293 OTHER ABNORMAL AUDITORY PERCEPTIONS, BILATERAL: ICD-10-CM

## 2024-10-30 PROCEDURE — 99203 OFFICE O/P NEW LOW 30 MIN: CPT

## 2024-10-30 RX ORDER — ACETAMINOPHEN 325 MG/1
TABLET ORAL
Refills: 0 | Status: ACTIVE | COMMUNITY

## 2024-10-30 RX ORDER — LISINOPRIL 30 MG/1
TABLET ORAL
Refills: 0 | Status: ACTIVE | COMMUNITY

## 2024-11-26 ENCOUNTER — APPOINTMENT (OUTPATIENT)
Dept: FAMILY MEDICINE | Facility: CLINIC | Age: 64
End: 2024-11-26
Payer: MEDICARE

## 2024-11-26 VITALS
BODY MASS INDEX: 32.49 KG/M2 | DIASTOLIC BLOOD PRESSURE: 82 MMHG | HEART RATE: 82 BPM | WEIGHT: 195 LBS | HEIGHT: 65 IN | TEMPERATURE: 97.4 F | OXYGEN SATURATION: 97 % | SYSTOLIC BLOOD PRESSURE: 122 MMHG

## 2024-11-26 DIAGNOSIS — N50.819 TESTICULAR PAIN, UNSPECIFIED: ICD-10-CM

## 2024-11-26 DIAGNOSIS — R35.0 FREQUENCY OF MICTURITION: ICD-10-CM

## 2024-11-26 LAB
BILIRUB UR QL STRIP: NEGATIVE
CLARITY UR: CLEAR
COLLECTION METHOD: NORMAL
GLUCOSE UR-MCNC: NEGATIVE
HCG UR QL: 0.2 EU/DL
HGB UR QL STRIP.AUTO: NEGATIVE
KETONES UR-MCNC: NEGATIVE
LEUKOCYTE ESTERASE UR QL STRIP: NEGATIVE
NITRITE UR QL STRIP: NEGATIVE
PH UR STRIP: 5.5
PROT UR STRIP-MCNC: NEGATIVE
SP GR UR STRIP: <=1.005

## 2024-11-26 PROCEDURE — 99213 OFFICE O/P EST LOW 20 MIN: CPT

## 2024-11-26 PROCEDURE — 81003 URINALYSIS AUTO W/O SCOPE: CPT | Mod: QW

## 2024-11-26 RX ORDER — PHENAZOPYRIDINE 200 MG/1
200 TABLET, FILM COATED ORAL 3 TIMES DAILY
Qty: 6 | Refills: 0 | Status: ACTIVE | COMMUNITY
Start: 2024-11-26 | End: 1900-01-01

## 2024-11-28 LAB — BACTERIA UR CULT: NORMAL

## 2025-01-28 ENCOUNTER — NON-APPOINTMENT (OUTPATIENT)
Age: 65
End: 2025-01-28

## 2025-01-28 ENCOUNTER — RESULT CHARGE (OUTPATIENT)
Age: 65
End: 2025-01-28

## 2025-01-28 ENCOUNTER — APPOINTMENT (OUTPATIENT)
Dept: FAMILY MEDICINE | Facility: CLINIC | Age: 65
End: 2025-01-28
Payer: MEDICARE

## 2025-01-28 VITALS
BODY MASS INDEX: 33.32 KG/M2 | DIASTOLIC BLOOD PRESSURE: 80 MMHG | HEIGHT: 65 IN | TEMPERATURE: 97.3 F | HEART RATE: 63 BPM | OXYGEN SATURATION: 96 % | SYSTOLIC BLOOD PRESSURE: 118 MMHG | WEIGHT: 200 LBS

## 2025-01-28 DIAGNOSIS — N18.30 CHRONIC KIDNEY DISEASE, STAGE 3 UNSPECIFIED: ICD-10-CM

## 2025-01-28 DIAGNOSIS — M10.9 GOUT, UNSPECIFIED: ICD-10-CM

## 2025-01-28 DIAGNOSIS — E55.9 VITAMIN D DEFICIENCY, UNSPECIFIED: ICD-10-CM

## 2025-01-28 DIAGNOSIS — E03.9 HYPOTHYROIDISM, UNSPECIFIED: ICD-10-CM

## 2025-01-28 DIAGNOSIS — E11.9 TYPE 2 DIABETES MELLITUS W/OUT COMPLICATIONS: ICD-10-CM

## 2025-01-28 DIAGNOSIS — I10 ESSENTIAL (PRIMARY) HYPERTENSION: ICD-10-CM

## 2025-01-28 DIAGNOSIS — Z00.00 ENCOUNTER FOR GENERAL ADULT MEDICAL EXAMINATION W/OUT ABNORMAL FINDINGS: ICD-10-CM

## 2025-01-28 PROCEDURE — G0438: CPT

## 2025-01-28 PROCEDURE — 36415 COLL VENOUS BLD VENIPUNCTURE: CPT

## 2025-01-28 PROCEDURE — 93000 ELECTROCARDIOGRAM COMPLETE: CPT

## 2025-01-29 LAB
25(OH)D3 SERPL-MCNC: 43.4 NG/ML
ALBUMIN SERPL ELPH-MCNC: 4.5 G/DL
ALP BLD-CCNC: 86 U/L
ALT SERPL-CCNC: 22 U/L
ANION GAP SERPL CALC-SCNC: 11 MMOL/L
AST SERPL-CCNC: 19 U/L
BASOPHILS # BLD AUTO: 0.08 K/UL
BASOPHILS NFR BLD AUTO: 0.8 %
BILIRUB SERPL-MCNC: 0.2 MG/DL
BUN SERPL-MCNC: 22 MG/DL
CALCIUM SERPL-MCNC: 10 MG/DL
CHLORIDE SERPL-SCNC: 100 MMOL/L
CHOLEST SERPL-MCNC: 69 MG/DL
CO2 SERPL-SCNC: 26 MMOL/L
CREAT SERPL-MCNC: 1.23 MG/DL
CREAT SPEC-SCNC: 38 MG/DL
EGFR: 65 ML/MIN/1.73M2
EOSINOPHIL # BLD AUTO: 0.23 K/UL
EOSINOPHIL NFR BLD AUTO: 2.3 %
ESTIMATED AVERAGE GLUCOSE: 143 MG/DL
GLUCOSE SERPL-MCNC: 116 MG/DL
HBA1C MFR BLD HPLC: 6.6 %
HCT VFR BLD CALC: 42.5 %
HDLC SERPL-MCNC: 35 MG/DL
HGB BLD-MCNC: 14.1 G/DL
IMM GRANULOCYTES NFR BLD AUTO: 0.4 %
LDLC SERPL CALC-MCNC: 20 MG/DL
LYMPHOCYTES # BLD AUTO: 2.45 K/UL
LYMPHOCYTES NFR BLD AUTO: 24.4 %
MAN DIFF?: NORMAL
MCHC RBC-ENTMCNC: 30 PG
MCHC RBC-ENTMCNC: 33.2 G/DL
MCV RBC AUTO: 90.4 FL
MICROALBUMIN 24H UR DL<=1MG/L-MCNC: <1.2 MG/DL
MICROALBUMIN/CREAT 24H UR-RTO: NORMAL MG/G
MONOCYTES # BLD AUTO: 0.84 K/UL
MONOCYTES NFR BLD AUTO: 8.4 %
NEUTROPHILS # BLD AUTO: 6.39 K/UL
NEUTROPHILS NFR BLD AUTO: 63.7 %
NONHDLC SERPL-MCNC: 34 MG/DL
PLATELET # BLD AUTO: 290 K/UL
POTASSIUM SERPL-SCNC: 5 MMOL/L
PROT SERPL-MCNC: 7.5 G/DL
PSA SERPL-MCNC: 0.11 NG/ML
RBC # BLD: 4.7 M/UL
RBC # FLD: 13.7 %
SODIUM SERPL-SCNC: 136 MMOL/L
T4 FREE SERPL-MCNC: 1.2 NG/DL
TRIGL SERPL-MCNC: 58 MG/DL
TSH SERPL-ACNC: 4.07 UIU/ML
URATE SERPL-MCNC: 5.4 MG/DL
WBC # FLD AUTO: 10.03 K/UL

## 2025-07-29 ENCOUNTER — NON-APPOINTMENT (OUTPATIENT)
Age: 65
End: 2025-07-29

## 2025-07-31 ENCOUNTER — APPOINTMENT (OUTPATIENT)
Dept: FAMILY MEDICINE | Facility: CLINIC | Age: 65
End: 2025-07-31
Payer: MEDICARE

## 2025-07-31 VITALS
OXYGEN SATURATION: 96 % | HEART RATE: 78 BPM | TEMPERATURE: 97.2 F | WEIGHT: 195 LBS | SYSTOLIC BLOOD PRESSURE: 130 MMHG | BODY MASS INDEX: 32.49 KG/M2 | HEIGHT: 65 IN | DIASTOLIC BLOOD PRESSURE: 78 MMHG

## 2025-07-31 DIAGNOSIS — E55.9 VITAMIN D DEFICIENCY, UNSPECIFIED: ICD-10-CM

## 2025-07-31 DIAGNOSIS — M10.9 GOUT, UNSPECIFIED: ICD-10-CM

## 2025-07-31 DIAGNOSIS — I10 ESSENTIAL (PRIMARY) HYPERTENSION: ICD-10-CM

## 2025-07-31 DIAGNOSIS — E11.9 TYPE 2 DIABETES MELLITUS W/OUT COMPLICATIONS: ICD-10-CM

## 2025-07-31 DIAGNOSIS — E03.9 HYPOTHYROIDISM, UNSPECIFIED: ICD-10-CM

## 2025-07-31 PROCEDURE — G2211 COMPLEX E/M VISIT ADD ON: CPT

## 2025-07-31 PROCEDURE — 36415 COLL VENOUS BLD VENIPUNCTURE: CPT

## 2025-07-31 PROCEDURE — 99204 OFFICE O/P NEW MOD 45 MIN: CPT
